# Patient Record
Sex: MALE | Race: WHITE | NOT HISPANIC OR LATINO | Employment: FULL TIME | URBAN - METROPOLITAN AREA
[De-identification: names, ages, dates, MRNs, and addresses within clinical notes are randomized per-mention and may not be internally consistent; named-entity substitution may affect disease eponyms.]

---

## 2018-07-13 ENCOUNTER — OFFICE VISIT (OUTPATIENT)
Dept: DERMATOLOGY | Facility: CLINIC | Age: 33
End: 2018-07-13
Payer: COMMERCIAL

## 2018-07-13 DIAGNOSIS — Z12.83 SKIN CANCER SCREENING: ICD-10-CM

## 2018-07-13 DIAGNOSIS — D48.5 NEOPLASM OF UNCERTAIN BEHAVIOR OF SKIN: Primary | ICD-10-CM

## 2018-07-13 RX ORDER — LIDOCAINE HYDROCHLORIDE AND EPINEPHRINE 10; 10 MG/ML; UG/ML
3 INJECTION, SOLUTION INFILTRATION; PERINEURAL ONCE
Qty: 3 ML | Refills: 0 | OUTPATIENT
Start: 2018-07-13 | End: 2018-07-13

## 2018-07-13 ASSESSMENT — PAIN SCALES - GENERAL
PAINLEVEL: NO PAIN (0)
PAINLEVEL: NO PAIN (0)

## 2018-07-13 NOTE — NURSING NOTE
Dermatology Rooming Note    Yakvo Smith's goals for this visit include:   Chief Complaint   Patient presents with     Skin Check     Yakov is here today for a skin check- has some areas of concerns.      Angela Tafoya MA

## 2018-07-13 NOTE — LETTER
7/13/2018       RE: Yakov Smith  [address]  [place] [address]     Dear Colleague,    Thank you for referring your patient, Yakov Smith, to the [facility] DERMATOLOGY at [facility]. Please see a copy of my visit note below.    [facility] Dermatology Note      Dermatology Problem List:  1.NUB x 2 - s/p bx 7/13/18    CC:   Chief Complaint   Patient presents with     Skin Check     Yakov is here today for a skin check- has some areas of concerns.          Encounter Date: Jul 13, 2018    History of Present Illness:  Mr. Yakov Smith is a 32 year old male who is new to the clinic who presents for a skin cancer screening. He has a few spots of concern - one on the neck, a few on the arms and one on he right underarm. He mostly thinks they are raised, and the ones on his arms might have an abnml shape. The patient denies painful, itching, tingling or bleeding lesions unless otherwise noted. No hx of skin cancer and no fhx of melanoma. He has had several moles removed since childhood, but none that have been abnml that he knows of. He is declining a full skin exam today.     Past Medical History:   There is no problem list on file for this patient.    History reviewed. No pertinent past medical history.  History reviewed. No pertinent surgical history.    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 Recently moved here from [place].    Family History:  There is no family history of skin cancer. There is no family history of melanoma.    Medications:  No current outpatient prescriptions on file.     Allergies   Allergen Reactions     Food      Oysters          Review of Systems:  -Heme/Lymph: no concerning bumps, no bleeding or bruising problems   -Constitutional: The patient denies fatigue, fevers, chills, unintended weight loss, and night sweats.  -Skin: As above in HPI. No additional skin  concerns.    Physical exam:  Vitals: There were no vitals taken for this visit.  GEN: This is a well developed, well-nourished male in no acute distress, in a pleasant mood.    SKIN: Waist-up skin, which includes the head/face, neck, both arms, chest, back, abdomen, digits and/or nails was examined.  -There is a 4mm brown irregular pigmented macule on the left elbow and a similar appearing one ont he right mid back - both have irregular shape as well.  -Mcdonald's skin type II, greater than 100 nevi  -No other lesions of concern on areas examined.     Impression/Plan:  1. Skin Cancer Screening    Continue with skin exams every 2 years    2. Neoplasm of uncertain behavior on the left elbow and right mid back. The differential diagnosis includes MM vs DN for both sites.     Shave biopsy x2:  After discussion of benefits and risks including but not limited to bleeding/bruising, pain/swelling, infection, scar, incomplete removal, nerve damage/numbness, recurrence, and non-diagnostic biopsy, written consent, verbal consent and photographs were obtained. Time-out was performed. The area was cleaned with isopropyl alcohol.  was injected to obtain adequate anesthesia of the lesion on the above listed sites. 2ml of 1% lidocaine was injected to obtain adequate anesthesia. A  shave biopsy was performed. Hemostasis was achieved with aluminium chloride. Vaseline and a sterile dressing were applied. The patient tolerated the procedure and no complications were noted. The patient was provided with verbal and written post care instructions.      CC Dr. Mike on close of this encounter.  Follow-up in 1 year, earlier for new or changing lesions.       Staff Involved:  Staff Only  All risks, benefits and alternatives were discussed with patient.  Patient is in agreement and understands the assessment and plan.  All questions were answered.    Sophia Reina PA-C  [facility]: Phone: [phone], Fax: [phone]

## 2018-07-13 NOTE — MR AVS SNAPSHOT
After Visit Summary   7/13/2018    Yakov Smith    MRN: [id number]           Patient Information     Date Of Birth          1985        Visit Information        Provider Department      7/13/2018 12:00 PM Sophia Reina PA-C [facility] Dermatology        Today's Diagnoses     Neoplasm of uncertain behavior of skin    -  1      Care Instructions    Wound Care After a Biopsy    What is a skin biopsy?  A skin biopsy allows the doctor to examine a very small piece of tissue under the microscope to determine the diagnosis and the best treatment for the skin condition. A local anesthetic (numbing medicine)  is injected with a very small needle into the skin area to be tested. A small piece of skin is taken from the area. Sometimes a suture (stitch) is used.     What are the risks of a skin biopsy?  I will experience scar, bleeding, swelling, pain, crusting and redness. I may experience incomplete removal or recurrence. Risks of this procedure are excessive bleeding, bruising, infection, nerve damage, numbness, thick (hypertrophic or keloidal) scar and non-diagnostic biopsy.    How should I care for my wound for the first 24 hours?    Keep the wound dry and covered for 24 hours    If it bleeds, hold direct pressure on the area for 15 minutes. If bleeding does not stop then go to the emergency room    Avoid strenuous exercise the first 1-2 days or as your doctor instructs you    How should I care for the wound after 24 hours?    After 24 hours, remove the bandage    You may bathe or shower as normal    If you had a scalp biopsy, you can shampoo as usual and can use shower water to clean the biopsy site daily    Clean the wound twice a day with gentle soap and water    Do not scrub, be gentle    Apply white petroleum/Vaseline after cleaning the wound with a cotton swab or a clean finger, and keep the site covered with a Bandaid /bandage. Bandages are not necessary with a scalp biopsy    If you are  unable to cover the site with a Bandaid /bandage, re-apply ointment 2-3 times a day to keep the site moist. Moisture will help with healing    Avoid strenuous activity for first 1-2 days    Avoid lakes, rivers, pools, and oceans until the stitches are removed or the site is healed    How do I clean my wound?    Wash hands thoroughly with soap or use hand  before all wound care    Clean the wound with gentle soap and water    Apply white petroleum/Vaseline  to wound after it is clean    Replace the Bandaid /bandage to keep the wound covered for the first few days or as instructed by your doctor    If you had a scalp biopsy, warm shower water to the area on a daily basis should suffice    What should I use to clean my wound?     Cotton-tipped applicators (Qtips )    White petroleum jelly (Vaseline ). Use a clean new container and use Q-tips to apply.    Bandaids   as needed    Gentle soap     How should I care for my wound long term?    Do not get your wound dirty    Keep up with wound care for one week or until the area is healed.    A small scab will form and fall off by itself when the area is completely healed. The area will be red and will become pink in color as it heals. Sun protection is very important for how your scar will turn out. Sunscreen with an SPF 30 or greater is recommended once the area is healed.    You should have some soreness but it should be mild and slowly go away over several days. Talk to your doctor about using tylenol for pain,    When should I call my doctor?  If you have increased:     Pain or swelling    Pus or drainage (clear or slightly yellow drainage is ok)    Temperature over 100F    Spreading redness or warmth around wound    When will I hear about my results?  The biopsy results can take 2-3 weeks to come back. The clinic will call you with the results, send you a [facility] message, or have you schedule a follow-up clinic or phone time to discuss the results. Contact our  clinics if you do not hear from us in 3 weeks.     Who should I call with questions?    [facility]: [phone]     [facility]: [phone]    For urgent needs outside of business hours call the [facility] at [phone] and ask for the dermatology resident on call              Follow-ups after your visit        Who to contact     Please call your clinic at [phone] to:    Ask questions about your health    Make or cancel appointments    Discuss your medicines    Learn about your test results    Speak to your doctor            Additional Information About Your Visit        [employer] Information     [employer] gives you secure access to your electronic health record. If you see a primary care provider, you can also send messages to your care team and make appointments. If you have questions, please call your primary care clinic.  If you do not have a primary care provider, please call [phone] and they will assist you.      [employer] is an electronic gateway that provides easy, online access to your medical records. With [employer], you can request a clinic appointment, read your test results, renew a prescription or communicate with your care team.     To access your existing account, please contact your [facility] or call [phone] for assistance.        Care EveryWhere ID     This is your Care EveryWhere ID. This could be used by other organizations to access your [place] medical records  [id number]         Blood Pressure from Last 3 Encounters:   No data found for BP    Weight from Last 3 Encounters:   No data found for Wt              We Performed the Following     BIOPSY SKIN/SUBQ/MUC MEM, EACH ADDTL LESION     BIOPSY SKIN/SUBQ/MUC MEM, SINGLE LESION     Dermatological path order and indications          Today's Medication Changes          These changes are accurate as of 7/13/18 12:24 PM.  If you have  any questions, ask your nurse or doctor.               Start taking these medicines.        Dose/Directions    lidocaine 1% with EPINEPHrine 1:100,000 1 %-1:264778 injection   Used for:  Neoplasm of uncertain behavior of skin   Started by:  Sophia Reina PA-C        Dose:  3 mL   Inject 3 mLs into the skin once for 1 dose   Quantity:  3 mL   Refills:  0            Where to get your medicines      Some of these will need a paper prescription and others can be bought over the counter.  Ask your nurse if you have questions.     You don't need a prescription for these medications     lidocaine 1% with EPINEPHrine 1:100,000 1 %-1:995054 injection                Primary Care Provider    None Specified       No primary provider on file.        Equal Access to Services     [facility] : Mariela Cordova, gigi cummins, cuong botello, sarina helm . So [facility] [phone].    ATENCIÓN: Si habla español, tiene a johansen disposición servicios gratuitos de asistencia lingüística. Llame al [phone].    We comply with applicable federal civil rights laws and [place] laws. We do not discriminate on the basis of race, color, national origin, age, disability, sex, sexual orientation, or gender identity.            Thank you!     Thank you for choosing [facility] DERMATOLOGY  for your care. Our goal is always to provide you with excellent care. Hearing back from our patients is one way we can continue to improve our services. Please take a few minutes to complete the written survey that you may receive in the mail after your visit with us. Thank you!             Your Updated Medication List - Protect others around you: Learn how to safely use, store and throw away your medicines at www.disposemymeds.org.          This list is accurate as of 7/13/18 12:24 PM.  Always use your most recent med list.                   Brand Name Dispense Instructions for use Diagnosis    lidocaine  1% with EPINEPHrine 1:100,000 1 %-1:808727 injection     3 mL    Inject 3 mLs into the skin once for 1 dose    Neoplasm of uncertain behavior of skin

## 2018-07-13 NOTE — PROGRESS NOTES
[facility] Dermatology Note      Dermatology Problem List:  1.NUB x 2 - s/p bx 7/13/18    CC:   Chief Complaint   Patient presents with     Skin Check     Yakov is here today for a skin check- has some areas of concerns.          Encounter Date: Jul 13, 2018    History of Present Illness:  Mr. Yakov Smith is a 32 year old male who is new to the clinic who presents for a skin cancer screening. He has a few spots of concern - one on the neck, a few on the arms and one on he right underarm. He mostly thinks they are raised, and the ones on his arms might have an abnml shape. The patient denies painful, itching, tingling or bleeding lesions unless otherwise noted. No hx of skin cancer and no fhx of melanoma. He has had several moles removed since childhood, but none that have been abnml that he knows of. He is declining a full skin exam today.     Past Medical History:   There is no problem list on file for this patient.    History reviewed. No pertinent past medical history.  History reviewed. No pertinent surgical history.    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    Family History:  There is no family history of skin cancer. There is no family history of melanoma.    Medications:  No current outpatient prescriptions on file.     Allergies   Allergen Reactions     Food      Oysters          Review of Systems:  -Heme/Lymph: no concerning bumps, no bleeding or bruising problems   -Constitutional: The patient denies fatigue, fevers, chills, unintended weight loss, and night sweats.  -Skin: As above in HPI. No additional skin concerns.    Physical exam:  Vitals: There were no vitals taken for this visit.  GEN: This is a well developed, well-nourished male in no acute distress, in a pleasant mood.    SKIN: Waist-up skin, which includes the head/face, neck, both arms, chest, back, abdomen, digits and/or nails was examined.  -There is  a 4mm brown irregular pigmented macule on the left elbow and a similar appearing one ont he right mid back - both have irregular shape as well.  -Mcdonald's skin type II, greater than 100 nevi  -No other lesions of concern on areas examined.     Impression/Plan:  1. Skin Cancer Screening    Continue with skin exams every 2 years    2. Neoplasm of uncertain behavior on the left elbow and right mid back. The differential diagnosis includes MM vs DN for both sites.     Shave biopsy x2:  After discussion of benefits and risks including but not limited to bleeding/bruising, pain/swelling, infection, scar, incomplete removal, nerve damage/numbness, recurrence, and non-diagnostic biopsy, written consent, verbal consent and photographs were obtained. Time-out was performed. The area was cleaned with isopropyl alcohol.  was injected to obtain adequate anesthesia of the lesion on the above listed sites. 2ml of 1% lidocaine was injected to obtain adequate anesthesia. A  shave biopsy was performed. Hemostasis was achieved with aluminium chloride. Vaseline and a sterile dressing were applied. The patient tolerated the procedure and no complications were noted. The patient was provided with verbal and written post care instructions.      CC Dr. Mike on close of this encounter.  Follow-up in 1 year, earlier for new or changing lesions.       Staff Involved:  Staff Only  All risks, benefits and alternatives were discussed with patient.  Patient is in agreement and understands the assessment and plan.  All questions were answered.    Sophia Reina PA-C  [facility]: Phone: [phone], Fax: [phone]

## 2018-07-13 NOTE — NURSING NOTE
Lidocaine 1%  1mL once for one use, starting 7/13/2018 ending 7/13/2018,  2mL disp, R-0, injection  Injected by Angela Tafoya MA

## 2018-07-13 NOTE — PATIENT INSTRUCTIONS

## 2018-07-16 LAB — COPATH REPORT: NORMAL

## 2020-03-11 ENCOUNTER — HEALTH MAINTENANCE LETTER (OUTPATIENT)
Age: 35
End: 2020-03-11

## 2021-01-03 ENCOUNTER — HEALTH MAINTENANCE LETTER (OUTPATIENT)
Age: 36
End: 2021-01-03

## 2021-04-25 ENCOUNTER — HEALTH MAINTENANCE LETTER (OUTPATIENT)
Age: 36
End: 2021-04-25

## 2021-10-10 ENCOUNTER — HEALTH MAINTENANCE LETTER (OUTPATIENT)
Age: 36
End: 2021-10-10

## 2022-05-21 ENCOUNTER — HEALTH MAINTENANCE LETTER (OUTPATIENT)
Age: 37
End: 2022-05-21

## 2022-09-18 ENCOUNTER — HEALTH MAINTENANCE LETTER (OUTPATIENT)
Age: 37
End: 2022-09-18

## 2022-09-22 ENCOUNTER — HOSPITAL ENCOUNTER (OUTPATIENT)
Facility: CLINIC | Age: 37
Setting detail: OBSERVATION
Discharge: HOME | End: 2022-09-22
Attending: EMERGENCY MEDICINE | Admitting: NURSE PRACTITIONER
Payer: COMMERCIAL

## 2022-09-22 VITALS
SYSTOLIC BLOOD PRESSURE: 128 MMHG | DIASTOLIC BLOOD PRESSURE: 82 MMHG | TEMPERATURE: 98 F | OXYGEN SATURATION: 98 % | HEIGHT: 68 IN | HEART RATE: 62 BPM | RESPIRATION RATE: 16 BRPM | BODY MASS INDEX: 28.04 KG/M2 | WEIGHT: 185 LBS

## 2022-09-22 DIAGNOSIS — T78.40XA ALLERGIC REACTION, INITIAL ENCOUNTER: ICD-10-CM

## 2022-09-22 DIAGNOSIS — T78.3XXA ANGIOEDEMA, INITIAL ENCOUNTER: ICD-10-CM

## 2022-09-22 DIAGNOSIS — R22.0 TONGUE SWELLING: ICD-10-CM

## 2022-09-22 PROCEDURE — 250N000013 HC RX MED GY IP 250 OP 250 PS 637: Performed by: EMERGENCY MEDICINE

## 2022-09-22 PROCEDURE — G0378 HOSPITAL OBSERVATION PER HR: HCPCS

## 2022-09-22 PROCEDURE — 99285 EMERGENCY DEPT VISIT HI MDM: CPT | Mod: 25 | Performed by: EMERGENCY MEDICINE

## 2022-09-22 PROCEDURE — 96361 HYDRATE IV INFUSION ADD-ON: CPT | Performed by: EMERGENCY MEDICINE

## 2022-09-22 PROCEDURE — 96375 TX/PRO/DX INJ NEW DRUG ADDON: CPT | Performed by: EMERGENCY MEDICINE

## 2022-09-22 PROCEDURE — 250N000009 HC RX 250: Performed by: EMERGENCY MEDICINE

## 2022-09-22 PROCEDURE — 250N000011 HC RX IP 250 OP 636: Performed by: EMERGENCY MEDICINE

## 2022-09-22 PROCEDURE — 99284 EMERGENCY DEPT VISIT MOD MDM: CPT | Performed by: EMERGENCY MEDICINE

## 2022-09-22 PROCEDURE — 96361 HYDRATE IV INFUSION ADD-ON: CPT

## 2022-09-22 PROCEDURE — 258N000003 HC RX IP 258 OP 636: Performed by: EMERGENCY MEDICINE

## 2022-09-22 PROCEDURE — 96374 THER/PROPH/DIAG INJ IV PUSH: CPT | Performed by: EMERGENCY MEDICINE

## 2022-09-22 PROCEDURE — 99219 PR INITIAL OBSERVATION CARE,LEVEL II: CPT | Performed by: PHYSICIAN ASSISTANT

## 2022-09-22 RX ORDER — SODIUM CHLORIDE 9 MG/ML
INJECTION, SOLUTION INTRAVENOUS CONTINUOUS
Status: DISCONTINUED | OUTPATIENT
Start: 2022-09-22 | End: 2022-09-22 | Stop reason: HOSPADM

## 2022-09-22 RX ORDER — LORATADINE 10 MG/1
10 TABLET ORAL DAILY PRN
COMMUNITY
Start: 2022-09-22

## 2022-09-22 RX ORDER — PREDNISONE 20 MG/1
40 TABLET ORAL DAILY
Qty: 6 TABLET | Refills: 0 | Status: SHIPPED | OUTPATIENT
Start: 2022-09-22

## 2022-09-22 RX ORDER — TRANEXAMIC ACID 10 MG/ML
1 INJECTION, SOLUTION INTRAVENOUS ONCE
Status: COMPLETED | OUTPATIENT
Start: 2022-09-22 | End: 2022-09-22

## 2022-09-22 RX ORDER — ONDANSETRON 2 MG/ML
4 INJECTION INTRAMUSCULAR; INTRAVENOUS EVERY 6 HOURS PRN
Status: DISCONTINUED | OUTPATIENT
Start: 2022-09-22 | End: 2022-09-22 | Stop reason: HOSPADM

## 2022-09-22 RX ORDER — DIPHENHYDRAMINE HCL 25 MG
25 CAPSULE ORAL EVERY 6 HOURS PRN
COMMUNITY
Start: 2022-09-22

## 2022-09-22 RX ORDER — DIPHENHYDRAMINE HYDROCHLORIDE 50 MG/ML
25 INJECTION INTRAMUSCULAR; INTRAVENOUS EVERY 6 HOURS PRN
Status: DISCONTINUED | OUTPATIENT
Start: 2022-09-22 | End: 2022-09-22 | Stop reason: HOSPADM

## 2022-09-22 RX ORDER — ONDANSETRON 4 MG/1
4 TABLET, ORALLY DISINTEGRATING ORAL EVERY 6 HOURS PRN
Status: DISCONTINUED | OUTPATIENT
Start: 2022-09-22 | End: 2022-09-22 | Stop reason: HOSPADM

## 2022-09-22 RX ORDER — DIPHENHYDRAMINE HYDROCHLORIDE 50 MG/ML
50 INJECTION INTRAMUSCULAR; INTRAVENOUS ONCE
Status: COMPLETED | OUTPATIENT
Start: 2022-09-22 | End: 2022-09-22

## 2022-09-22 RX ORDER — ACETAMINOPHEN 325 MG/1
650 TABLET ORAL EVERY 6 HOURS PRN
Status: DISCONTINUED | OUTPATIENT
Start: 2022-09-22 | End: 2022-09-22 | Stop reason: HOSPADM

## 2022-09-22 RX ORDER — DIPHENHYDRAMINE HCL 25 MG
25 CAPSULE ORAL EVERY 6 HOURS PRN
Status: DISCONTINUED | OUTPATIENT
Start: 2022-09-22 | End: 2022-09-22 | Stop reason: HOSPADM

## 2022-09-22 RX ORDER — LORATADINE 10 MG/1
10 TABLET ORAL DAILY
Status: DISCONTINUED | OUTPATIENT
Start: 2022-09-23 | End: 2022-09-22 | Stop reason: HOSPADM

## 2022-09-22 RX ORDER — LORATADINE 10 MG/1
10 TABLET ORAL DAILY
Status: DISCONTINUED | OUTPATIENT
Start: 2022-09-22 | End: 2022-09-22 | Stop reason: HOSPADM

## 2022-09-22 RX ORDER — EPINEPHRINE 0.3 MG/.3ML
0.3 INJECTION SUBCUTANEOUS PRN
Qty: 1 EACH | Refills: 0 | Status: SHIPPED | OUTPATIENT
Start: 2022-09-22

## 2022-09-22 RX ORDER — FAMOTIDINE 20 MG/1
20 TABLET, FILM COATED ORAL 2 TIMES DAILY
Qty: 6 TABLET | Refills: 0 | Status: SHIPPED | OUTPATIENT
Start: 2022-09-22

## 2022-09-22 RX ORDER — PREDNISONE 20 MG/1
40 TABLET ORAL DAILY
Status: DISCONTINUED | OUTPATIENT
Start: 2022-09-22 | End: 2022-09-22 | Stop reason: HOSPADM

## 2022-09-22 RX ORDER — METHYLPREDNISOLONE SODIUM SUCCINATE 125 MG/2ML
125 INJECTION, POWDER, LYOPHILIZED, FOR SOLUTION INTRAMUSCULAR; INTRAVENOUS ONCE
Status: COMPLETED | OUTPATIENT
Start: 2022-09-22 | End: 2022-09-22

## 2022-09-22 RX ADMIN — LORATADINE 10 MG: 10 TABLET ORAL at 07:07

## 2022-09-22 RX ADMIN — SODIUM CHLORIDE 1000 ML: 9 INJECTION, SOLUTION INTRAVENOUS at 05:53

## 2022-09-22 RX ADMIN — FAMOTIDINE 20 MG: 10 INJECTION INTRAVENOUS at 06:44

## 2022-09-22 RX ADMIN — DIPHENHYDRAMINE HYDROCHLORIDE 50 MG: 50 INJECTION, SOLUTION INTRAMUSCULAR; INTRAVENOUS at 05:52

## 2022-09-22 RX ADMIN — METHYLPREDNISOLONE SODIUM SUCCINATE 125 MG: 125 INJECTION, POWDER, FOR SOLUTION INTRAMUSCULAR; INTRAVENOUS at 05:52

## 2022-09-22 RX ADMIN — TRANEXAMIC ACID 1 G: 10 INJECTION, SOLUTION INTRAVENOUS at 06:20

## 2022-09-22 RX ADMIN — SODIUM CHLORIDE: 9 INJECTION, SOLUTION INTRAVENOUS at 06:44

## 2022-09-22 ASSESSMENT — ACTIVITIES OF DAILY LIVING (ADL)
ADLS_ACUITY_SCORE: 35
ADLS_ACUITY_SCORE: 35

## 2022-09-22 NOTE — PROGRESS NOTES
"S: Patient mated for her angioedema reaction right-sided tongue.  Patient had some lip issues before in the past.  Never been diagnosed with angioedema has not any medications of family history otherwise.  Patient breathing fine would like to go home now feeling much better.  O:BP (!) 132/91   Pulse 58   Temp 97.7  F (36.5  C) (Temporal)   Resp 18   Ht 1.727 m (5' 8\")   Wt 83.9 kg (185 lb)   SpO2 99%   BMI 28.13 kg/m    General patient appears without gross facial swelling etc. vital signs stable otherwise airway intact.  Patient is laying flat handling secretions not dysphonic has no trismus.  Intraoral exam otherwise reveals no gross swelling otherwise identified no lesions otherwise identified.  No uvular swelling otherwise neck negative trachea is midline otherwise no stridor breath sounds equal otherwise extremities negative no rash etc. no joint swelling.  A: Recurrent angioedema right side tongue improved now.  P: Patient to be discharged with prednisone he has an EpiPen at home will use antihistamines also Pepcid along with follow-up with allergist for further testing return any concerns patient agrees with plan for likely home soon as he can.    "

## 2022-09-22 NOTE — ED PROVIDER NOTES
"ED Provider Note  [facility]      History     Chief Complaint   Patient presents with     Allergic Reaction     HPI  Yakov Smith is a 36 year old male who presents to us with a chief complaint of swelling of the tongue.  For the last 2 months patient has had numerous instances where his lower lip and that his upper lip at times would spontaneously swallow up.  He would take Benadryl for this and it would improve on its own.  He presents to us this evening after the right side of his tongue started to swell.  His tongue has not previously swollen.  He has not follow-up with primary care or a allergist for this at any point.  He denies any allergies except for shellfish.  He has not had exposure to this today.  He has no new medications.    Past Medical History  No past medical history on file.  No past surgical history on file.  No current outpatient medications on file.    Allergies   Allergen Reactions     Food      Oysters Mollusks      Family History  Family History   Problem Relation Age of Onset     Melanoma No family hx of      Skin Cancer No family hx of      Social History   Social History     Tobacco Use     Smoking status: Light Tobacco Smoker     Smokeless tobacco: Never Used      Past medical history, past surgical history, medications, allergies, family history, and social history were reviewed with the patient. No additional pertinent items.       Review of Systems  A complete review of systems was performed with pertinent positives and negatives noted in the HPI, and all other systems negative.    Physical Exam   BP: 132/83  Pulse: 70  Temp: 97.7  F (36.5  C)  Resp: 18  Height: 172.7 cm (5' 8\")  Weight: 83.9 kg (185 lb)  SpO2: 99 %  Physical Exam  Vitals and nursing note reviewed.   Constitutional:       General: He is not in acute distress.     Appearance: He is well-developed.   HENT:      Head: Normocephalic.      Right Ear: External ear normal.      Left Ear: " External ear normal.      Nose: Nose normal.      Mouth/Throat:      Comments: Right side of the tongue is swollen.  No airway obstruction or difficulty breathing because of this at this time  Eyes:      Extraocular Movements: Extraocular movements intact.      Conjunctiva/sclera: Conjunctivae normal.   Cardiovascular:      Rate and Rhythm: Normal rate and regular rhythm.      Heart sounds: Normal heart sounds.   Pulmonary:      Effort: Pulmonary effort is normal. No respiratory distress.      Breath sounds: Normal breath sounds.   Abdominal:      General: Abdomen is flat. There is no distension.   Musculoskeletal:         General: No deformity. Normal range of motion.      Cervical back: Normal range of motion and neck supple.   Skin:     General: Skin is warm and dry.   Neurological:      Mental Status: He is alert. Mental status is at baseline.      Comments: Oriented   Psychiatric:         Mood and Affect: Mood normal.         Behavior: Behavior normal.         ED Course      Procedures                No results found for any visits on 09/22/22.  Medications   0.9% sodium chloride BOLUS (1,000 mLs Intravenous New Bag 9/22/22 0553)     Followed by   sodium chloride 0.9% infusion (has no administration in time range)   famotidine (PEPCID) injection 20 mg (has no administration in time range)   loratadine (CLARITIN) tablet 10 mg (has no administration in time range)   methylPREDNISolone sodium succinate (solu-MEDROL) injection 125 mg (125 mg Intravenous Given 9/22/22 0552)   diphenhydrAMINE (BENADRYL) injection 50 mg (50 mg Intravenous Given 9/22/22 0552)   tranexamic acid 1 g in 100 mL NS IV bag (premix) (1 g Intravenous Given 9/22/22 0620)        Assessments & Plan (with Medical Decision Making)   36-year-old male presents to us with a chief complaint of tongue swelling.  There is no airway compromise.  Patient was given combination of Solu-Medrol, Benadryl, Pepcid, Claritin, TXA.  Patient initially felt the  swelling improved.  However upon reevaluation tongue size appears unchanged to me.  We will place the patient in the observation unit.  He states that when his lipids were not previously had lasted somewhere between 12 and 16 hours.  I have reviewed the nursing notes. I have reviewed the findings, diagnosis, plan and need for follow up with the patient.    New Prescriptions    No medications on file       Final diagnoses:   Tongue swelling   Allergic reaction, initial encounter   Angioedema, initial encounter       --  Jeramy Gayle  formerly [facility] EMERGENCY DEPARTMENT  9/22/2022     Jeramy Gayle DO  09/22/22 0683

## 2022-09-22 NOTE — H&P
[facility]    History and Physical - ED Observation Service       Date of Admission:  9/22/2022    Assessment & Plan      Yakov Smith is a 36 year old male with no significant PMH who presented to the ED with tongue swelling.    #Angioedema  Patient presents to the ED with tongue swelling which started last night. He states over the last 2 months he has had numerous instances where his lower lip and at times his upper lip would spontaneously swell. This typically would resolve with Benadryl. He has never seen an allergist and does not have a PCP. Only known allergy is Shellfish, which he denies any recent exposure to. No new medications. No family history of angioedema. In the ED, he was hemodynamically stable and protecting his airway. He was given IV Benadryl 50 mg, IV Pepcid 20 mg, Claritin 10 mg, IV Solu Medrol 125 mg.  -Continue IV Pepcid 20 mg BID  -Continue PO Claritin 10 mg daily, and Benadryl as needed  -Vitals q2h  -S/p IV Solu Medrol 125 mg x 1, start Prednisone 40 mg this afternoon  -Patient states he has an Epi Pen at home  -Referral to allergy clinic and PCP on discharge     Diet: Advance Diet as Tolerated: Regular Diet Adult  DVT Prophylaxis: Low Risk/Ambulatory with no VTE prophylaxis indicated  Cornejo Catheter: Not present  Central Lines: None  Cardiac Monitoring: None  Code Status: Full Code Full    Disposition Plan      Expected Discharge Date: 09/23/2022                The patient's care was discussed with the Attending Physician, Dr. Parker, Bedside Nurse and Patient.    Pat Ruth PA-C  ED Observation Service  [facility]  Securely message with the Vocera Web Console (learn more here)  Text page via [facility] Paging/Directory   ______________________________________________________________________    Chief Complaint   Tongue swelling    History is obtained from the patient    History of Present  "Illness   Per ED: \"Yakov Smith is a 36 year old male who presents to us with a chief complaint of swelling of the tongue.  For the last 2 months patient has had numerous instances where his lower lip and that his upper lip at times would spontaneously swallow up.  He would take Benadryl for this and it would improve on its own.  He presents to us this evening after the right side of his tongue started to swell.  His tongue has not previously swollen.  He has not follow-up with primary care or a allergist for this at any point.  He denies any allergies except for shellfish.  He has not had exposure to this today.  He has no new medications.\"    Review of Systems    The 10 point Review of Systems is negative other than noted in the HPI or here.     Past Medical History    I have reviewed this patient's medical history and updated it with pertinent information if needed.   No past medical history on file.    Past Surgical History   I have reviewed this patient's surgical history and updated it with pertinent information if needed.  No past surgical history on file.    Social History   I have reviewed this patient's social history and updated it with pertinent information if needed.  Social History     Tobacco Use     Smoking status: Light Tobacco Smoker     Smokeless tobacco: Never Used       Family History     No significant family history on file.    Prior to Admission Medications   None     Allergies   Allergies   Allergen Reactions     Food      Oysters Mollusks        Physical Exam   Vital Signs: Temp: 97.7  F (36.5  C) Temp src: Temporal BP: (!) 132/91 Pulse: 58   Resp: 18 SpO2: 99 % O2 Device: None (Room air)    Weight: 185 lbs 0 oz  Exam:  Constitutional: alert, no distress, and cooperative  Head: normocephalic, atraumatic  Neck: no asymmetry, masses, or scars  ENT: throat normal without erythema or exudate, improvement of R tongue swelling  Cardiovascular: RRR  Respiratory: diminished, respirations " unlabored, patient is protecting his airway  Gastrointestinal: (+) BS, non tender, soft  Musculoskeletal: normal muscle tone, no pitting edema  Skin: no suspicious lesions or rashes  Neurologic: oriented x 3, moves all extremities, no slurred speech  Psychiatric: normal affect and mood    Data   Data reviewed today: I reviewed all medications, new labs and imaging results over the last 24 hours.    No lab results found in last 7 days.    No results found for this or any previous visit (from the past 24 hour(s)).

## 2022-09-22 NOTE — DISCHARGE SUMMARY
[facility]  ED Observation Discharge Summary      Date of Admission:  2022  Date of Discharge:  2022  Discharging Provider: Pat Ruth PA-C  Discharge Service: ED Observation Service    Discharge Diagnoses   Angioedema involving the right side of tongue    Follow-ups Needed After Discharge   Follow-up Appointments     Follow Up and recommended labs and tests      Follow up with primary care provider, to establish as new patient within   1 month.  Follow up with allergy clinic for additional testing at next available   appointment.           Unresulted Labs Ordered in the Past 30 Days of this Admission     No orders found from 2022 to 2022.        Discharge Disposition   Discharged to home  Condition at discharge: Stable    Hospital Course   Yakov Smith is a 36 year old male with no significant PMH who presented to the ED with tongue swelling.     #Angioedema  Patient presents to the ED with tongue swelling which started last night. He states over the last 2 months he has had numerous instances where his lower lip and at times his upper lip would spontaneously swell. This typically would resolve with Benadryl. He has never seen an allergist and does not have a PCP. Only known allergy is Shellfish, which he denies any recent exposure to. No new medications. No family history of angioedema. In the ED, he was hemodynamically stable and protecting his airway. He was given IV Benadryl 50 mg, IV Pepcid 20 mg, Claritin 10 mg, IV Solu Medrol 125 mg. Admitted to ED Observation for further management. Continued Pepcid, Claritin, Prednisone. Patient reports improvement of his symptoms and reduced swelling. He is protecting his airway and has no difficulty managing his secretions. Stable to discharge home. Have sent referrals to allergy clinic and PCP. He reports he does have an Epi Pen at home but thinks it may be , therefore provided a new  prescription. Prescriptions were also provided for Prednisone and Pepcid. He can also take Benadryl available over the counter.    Consultations This Hospital Stay   None    Code Status   Full Code    Time Spent on this Encounter   I, Pat Ruth PA-C, personally saw the patient today and spent greater than 30 minutes discharging this patient.       Pat Ruth PA-C  [facility] EMERGENCY DEPARTMENT  [address] [address]  Phone: [phone]  ______________________________________________________________________    Physical Exam   Vital Signs: Temp: 97.7  F (36.5  C) Temp src: Temporal BP: (!) 132/91 Pulse: 58   Resp: 18 SpO2: 99 % O2 Device: None (Room air)    Weight: 185 lbs 0 oz  Exam:  Constitutional: alert, no distress, and cooperative  Head: normocephalic, atraumatic  Neck: no asymmetry, masses, or scars  ENT: throat normal without erythema or exudate, decreased R tongue swelling, no difficulty managing secretions  Cardiovascular: RRR  Respiratory: diminished, respirations unlabored, protecting airway  Gastrointestinal: (+) BS, non tender, soft  Musculoskeletal: normal muscle tone, no pitting edema  Skin: no suspicious lesions or rashes  Neurologic: oriented x 3, moves all extremities, no slurred speech  Psychiatric: normal affect and mood       Primary Care Physician   Physician No Ref-Primary    Discharge Orders      Adult Allergy/Asthma Referral      Primary Care Referral      Reason for your hospital stay    You were hospitalized for angioedema, an allergic reaction, and responded with steroids and antihistamines. You should take Prednisone 40 mg daily x 3 days (take first dose later this afternoon) and can continue Benadryl 25 mg every 6 hours or so, until you feel your swelling has completely resolved. Claritin is available over the counter and would be a good idea to have on hand as well, should you have any future swelling. We have referred you to an allergy clinic  for further testing.     Activity    Your activity upon discharge: activity as tolerated     Follow Up and recommended labs and tests    Follow up with primary care provider, to establish as new patient within 1 month.  Follow up with allergy clinic for additional testing at next available appointment.     When to contact your care team    Return to the ED if you have rapidly progressing lip/tongue swelling or difficulty breathing.       Significant Results and Procedures   No results found for this or any previous visit.    Discharge Medications   Current Discharge Medication List      START taking these medications    Details   diphenhydrAMINE (BENADRYL) 25 MG capsule Take 1 capsule (25 mg) by mouth every 6 hours as needed for itching or allergies    Associated Diagnoses: Angioedema, initial encounter      EPINEPHrine (ANY BX GENERIC EQUIV) 0.3 MG/0.3ML injection 2-pack Inject 0.3 mLs (0.3 mg) into the muscle as needed for anaphylaxis May repeat one time in 5-15 minutes if response to initial dose is inadequate.  Qty: 1 each, Refills: 0    Associated Diagnoses: Angioedema, initial encounter      famotidine (PEPCID) 20 MG tablet Take 1 tablet (20 mg) by mouth 2 times daily  Qty: 6 tablet, Refills: 0    Associated Diagnoses: Angioedema, initial encounter      loratadine (CLARITIN) 10 MG tablet Take 1 tablet (10 mg) by mouth daily as needed for allergies or other (allergic reaction)    Associated Diagnoses: Angioedema, initial encounter      predniSONE (DELTASONE) 20 MG tablet Take 2 tablets (40 mg) by mouth daily  Qty: 6 tablet, Refills: 0    Associated Diagnoses: Angioedema, initial encounter           Allergies   Allergies   Allergen Reactions     Food      Oysters Mollusks

## 2023-06-04 ENCOUNTER — HEALTH MAINTENANCE LETTER (OUTPATIENT)
Age: 38
End: 2023-06-04

## 2023-07-07 ENCOUNTER — HOSPITAL ENCOUNTER (EMERGENCY)
Facility: CLINIC | Age: 38
Discharge: HOME | End: 2023-07-07
Attending: EMERGENCY MEDICINE | Admitting: EMERGENCY MEDICINE
Payer: COMMERCIAL

## 2023-07-07 ENCOUNTER — APPOINTMENT (OUTPATIENT)
Dept: CT IMAGING | Facility: CLINIC | Age: 38
End: 2023-07-07
Attending: EMERGENCY MEDICINE
Payer: COMMERCIAL

## 2023-07-07 VITALS
HEART RATE: 68 BPM | DIASTOLIC BLOOD PRESSURE: 64 MMHG | SYSTOLIC BLOOD PRESSURE: 128 MMHG | RESPIRATION RATE: 16 BRPM | OXYGEN SATURATION: 98 % | TEMPERATURE: 98 F

## 2023-07-07 DIAGNOSIS — S09.90XA INJURY OF HEAD, INITIAL ENCOUNTER: ICD-10-CM

## 2023-07-07 PROCEDURE — 99283 EMERGENCY DEPT VISIT LOW MDM: CPT | Performed by: EMERGENCY MEDICINE

## 2023-07-07 PROCEDURE — 99284 EMERGENCY DEPT VISIT MOD MDM: CPT | Mod: 25 | Performed by: EMERGENCY MEDICINE

## 2023-07-07 PROCEDURE — 70450 CT HEAD/BRAIN W/O DYE: CPT

## 2023-07-07 PROCEDURE — 70450 CT HEAD/BRAIN W/O DYE: CPT | Mod: 26 | Performed by: RADIOLOGY

## 2023-07-07 ASSESSMENT — ACTIVITIES OF DAILY LIVING (ADL)
ADLS_ACUITY_SCORE: 33
ADLS_ACUITY_SCORE: 35

## 2023-07-07 NOTE — ED PROVIDER NOTES
[facility] EMERGENCY DEPARTMENT ([facility])    7/07/23       ED PROVIDER NOTE    History     Chief Complaint   Patient presents with     Head Laceration     HPI  Yakov Smith is a 37 year old male who presents to the ED from home after a fall w/ head laceration. On 7/3 patient had a fall while walking outside and struck the left side of his forehead on a rock.  This was dressed at home with bandage and has been healing well.. He claims his symptoms have improved but he has noticed an increase in swelling.  No swelling near his eyes which he initially thought was related to antibiotic ointment.  However he does note swelling to his forehead today as well.  Patient and family are concerned for bleed so patient presented to the Emergency Department.  Patient denies any headache or other associated symptoms.    Past Medical History  No past medical history on file.  No past surgical history on file.  diphenhydrAMINE (BENADRYL) 25 MG capsule  EPINEPHrine (ANY BX GENERIC EQUIV) 0.3 MG/0.3ML injection 2-pack  famotidine (PEPCID) 20 MG tablet  loratadine (CLARITIN) 10 MG tablet  predniSONE (DELTASONE) 20 MG tablet      Allergies   Allergen Reactions     Food      Oysters Mollusks      Family History  Family History   Problem Relation Age of Onset     Melanoma No family hx of      Skin Cancer No family hx of      Social History   Social History     Tobacco Use     Smoking status: Light Smoker     Smokeless tobacco: Never      Past medical history, past surgical history, medications, allergies, family history, and social history were reviewed with the patient. No additional pertinent items.      A medically appropriate review of systems was performed with pertinent positives and negatives noted in the HPI, and all other systems negative.    Physical Exam   Pulse: 68  Temp: 98  F (36.7  C)  Resp: 16  SpO2: 98 %  Physical Exam  Vitals and nursing note reviewed.   Constitutional:       General: He is not in acute  distress.     Appearance: He is well-developed. He is not diaphoretic.   HENT:      Head: Normocephalic and atraumatic.        Mouth/Throat:      Pharynx: No oropharyngeal exudate.   Eyes:      General: No scleral icterus.        Right eye: No discharge.         Left eye: No discharge.      Extraocular Movements: Extraocular movements intact.      Pupils: Pupils are equal, round, and reactive to light.   Musculoskeletal:         General: No tenderness or deformity. Normal range of motion.      Cervical back: Normal range of motion and neck supple.   Skin:     General: Skin is warm and dry.      Coloration: Skin is not pale.      Findings: No erythema or rash.   Neurological:      General: No focal deficit present.      Mental Status: He is alert and oriented to person, place, and time.      Cranial Nerves: No cranial nerve deficit.      Sensory: No sensory deficit.      Motor: No weakness.      Coordination: Coordination normal.   Psychiatric:         Mood and Affect: Mood normal.         Behavior: Behavior normal.           ED Course, Procedures, & Data      Procedures                      No results found for any visits on 07/07/23.  Medications - No data to display  Labs Ordered and Resulted from Time of ED Arrival to Time of ED Departure - No data to display  No orders to display              Assessment & Plan    This is a 37-year-old male who presents with left forehead swelling after fall.  Patient had a fall on 7 3 striking left side of his forehead causing a laceration.  This has been healing well but he has noticed more swelling to his forehead and upper face.  Exam demonstrates no other acute abnormalities.  Patient is neuro intact.  Head CT shows no acute abnormalities.  There is no bleed skull fracture or large trauma.  Soft tissue swelling is visible.  I discussed all results with patient.  Patient is up-to-date on tetanus status.  We will discharge with return precautions.    I have reviewed the  nursing notes. I have reviewed the findings, diagnosis, plan and need for follow up with the patient.    New Prescriptions    No medications on file       Final diagnoses:   None       I, Emani Ho, am serving as a trained medical scribe to document services personally performed by Leobardo Ellis DO based on the provider's statements to me on July 7, 2023.  This document has been checked and approved by the attending provider.    I, Leobardo Ellis DO, was physically present and have reviewed and verified the accuracy of this note documented by Emani Ho, medical scribe.      Leobardo Ellis DO  [facility] EMERGENCY DEPARTMENT  7/7/2023     Leobardo Ellis DO  07/07/23 8400

## 2023-07-07 NOTE — ED TRIAGE NOTES
From home for fall with head lac on Monday night  Sx improved, but has noticed an increase in swelling  Wants to rule out bleed     Triage Assessment     Row Name 07/07/23 1600       Triage Assessment (Adult)    Airway WDL WDL       Respiratory WDL    Respiratory WDL WDL       Skin Circulation/Temperature WDL    Skin Circulation/Temperature WDL WDL       Cardiac WDL    Cardiac WDL WDL       Peripheral/Neurovascular WDL    Peripheral Neurovascular WDL WDL       Cognitive/Neuro/Behavioral WDL    Cognitive/Neuro/Behavioral WDL WDL

## 2024-07-14 ENCOUNTER — HEALTH MAINTENANCE LETTER (OUTPATIENT)
Age: 39
End: 2024-07-14

## 2025-07-19 ENCOUNTER — HEALTH MAINTENANCE LETTER (OUTPATIENT)
Age: 40
End: 2025-07-19